# Patient Record
Sex: FEMALE | Employment: OTHER
[De-identification: names, ages, dates, MRNs, and addresses within clinical notes are randomized per-mention and may not be internally consistent; named-entity substitution may affect disease eponyms.]

---

## 2018-06-15 ENCOUNTER — CONSULTATION (OUTPATIENT)
Age: 68
End: 2018-06-15

## 2018-06-15 VITALS — DIASTOLIC BLOOD PRESSURE: 72 MMHG | SYSTOLIC BLOOD PRESSURE: 140 MMHG | HEIGHT: 55 IN

## 2018-06-15 NOTE — PATIENT DISCUSSION
Greater than 50% of exam spent in dialogue with patient. I have recommended a vitrectomy surgery to remove bothersome floaters from Mrs. Hankins's left eye. She understands the need for this surgery and would like to proceed. All questions and concerns were addressed at length with the patient. Schedule PPV OS.

## 2018-06-18 ASSESSMENT — VISUAL ACUITY
OS_PH: 20/40
OS_SC: 20/30
OD_SC: 20/25

## 2018-06-18 ASSESSMENT — TONOMETRY
OS_IOP_MMHG: 19
OD_IOP_MMHG: 16

## 2018-10-12 ENCOUNTER — FOLLOW UP (OUTPATIENT)
Age: 68
End: 2018-10-12

## 2018-10-12 NOTE — PATIENT DISCUSSION
Greater than 50% of exam spent in dialogue with patient. I have recommended a vitrectomy surgery in Mrs. Hankins's left eye. She understands the need for this surgery and would like to proceed. All questions and concerns were addressed at length with the patient. Schedule PPV OS.

## 2018-10-15 ASSESSMENT — TONOMETRY
OD_IOP_MMHG: 16
OS_IOP_MMHG: 18

## 2018-10-15 ASSESSMENT — VISUAL ACUITY
OD_SC: 20/20-1
OS_SC: 20/40-1

## 2019-02-08 ENCOUNTER — FOLLOW UP (OUTPATIENT)
Age: 69
End: 2019-02-08

## 2019-02-08 NOTE — PATIENT DISCUSSION
Discussed with the patient that the floaters are gone.  She might now and again see one but it will settle out of her sight.

## 2019-02-11 ASSESSMENT — VISUAL ACUITY
OS_SC: 20/25-1
OD_SC: 20/25-1

## 2019-02-11 ASSESSMENT — TONOMETRY
OS_IOP_MMHG: 22
OD_IOP_MMHG: 19

## 2020-07-25 ENCOUNTER — TELEPHONE ENCOUNTER (OUTPATIENT)
Dept: URBAN - METROPOLITAN AREA CLINIC 13 | Facility: CLINIC | Age: 70
End: 2020-07-25

## 2020-07-26 ENCOUNTER — TELEPHONE ENCOUNTER (OUTPATIENT)
Dept: URBAN - METROPOLITAN AREA CLINIC 13 | Facility: CLINIC | Age: 70
End: 2020-07-26

## 2024-08-06 ENCOUNTER — OFFICE VISIT (OUTPATIENT)
Dept: URBAN - METROPOLITAN AREA CLINIC 72 | Facility: CLINIC | Age: 74
End: 2024-08-06

## 2024-08-13 PROBLEM — 312824007: Status: ACTIVE | Noted: 2024-08-13

## 2024-08-13 PROBLEM — 102614006: Status: ACTIVE | Noted: 2024-08-13

## 2024-08-14 ENCOUNTER — DASHBOARD ENCOUNTERS (OUTPATIENT)
Age: 74
End: 2024-08-14

## 2024-08-14 ENCOUNTER — OFFICE VISIT (OUTPATIENT)
Dept: URBAN - METROPOLITAN AREA CLINIC 72 | Facility: CLINIC | Age: 74
End: 2024-08-14
Payer: MEDICARE

## 2024-08-14 VITALS
DIASTOLIC BLOOD PRESSURE: 63 MMHG | SYSTOLIC BLOOD PRESSURE: 122 MMHG | HEART RATE: 79 BPM | HEIGHT: 62 IN | WEIGHT: 171.8 LBS | BODY MASS INDEX: 31.62 KG/M2 | TEMPERATURE: 97.2 F

## 2024-08-14 DIAGNOSIS — R10.84 GENERALIZED ABDOMINAL PAIN: ICD-10-CM

## 2024-08-14 DIAGNOSIS — K59.01 SLOW TRANSIT CONSTIPATION: ICD-10-CM

## 2024-08-14 DIAGNOSIS — Z80.0 FAMILY HISTORY OF COLON CANCER: ICD-10-CM

## 2024-08-14 PROBLEM — 35298007: Status: ACTIVE | Noted: 2024-08-14

## 2024-08-14 PROCEDURE — 99204 OFFICE O/P NEW MOD 45 MIN: CPT

## 2024-08-14 RX ORDER — FAMOTIDINE 20 MG/1
TAKE 1 TABLET TABLET, FILM COATED ORAL ONCE A DAY
Status: ACTIVE | COMMUNITY

## 2024-08-14 RX ORDER — MULTIVIT-MIN/IRON/FOLIC ACID/K 18-600-40
AS DIRECTED CAPSULE ORAL
Status: ACTIVE | COMMUNITY

## 2024-08-14 RX ORDER — AMLODIPINE BESYLATE 2.5 MG/1
1 TABLET TABLET ORAL ONCE A DAY
Status: ACTIVE | COMMUNITY

## 2024-08-14 RX ORDER — LISINOPRIL AND HYDROCHLOROTHIAZIDE 10; 12.5 MG/1; MG/1
1 TABLET TABLET ORAL ONCE A DAY
Status: ACTIVE | COMMUNITY

## 2024-08-14 RX ORDER — MELATONIN 10 MG
1 TABLET TABLET, SUBLINGUAL SUBLINGUAL
Status: ACTIVE | COMMUNITY

## 2024-08-14 RX ORDER — LEVOTHYROXINE SODIUM 75 UG/1
1 TABLET IN THE MORNING ON AN EMPTY STOMACH TABLET ORAL ONCE A DAY
Status: ACTIVE | COMMUNITY

## 2024-08-14 RX ORDER — ROSUVASTATIN CALCIUM 20 MG/1
1 TABLET TABLET, COATED ORAL EVERY OTHER DAY
Status: ACTIVE | COMMUNITY

## 2024-08-14 RX ORDER — TIMOLOL MALEATE 5 MG/ML
1 DROP INTO AFFECTED EYE SOLUTION/ DROPS OPHTHALMIC ONCE A DAY
Status: ACTIVE | COMMUNITY

## 2024-08-14 RX ORDER — ROSUVASTATIN CALCIUM 10 MG/1
1 TABLET TABLET, COATED ORAL EVERY OTHER DAY
Status: ACTIVE | COMMUNITY

## 2024-08-14 RX ORDER — GABAPENTIN 600 MG/1
1 TABLET TABLET, FILM COATED ORAL ONCE A DAY
Status: ACTIVE | COMMUNITY

## 2024-08-14 NOTE — HPI-TODAY'S VISIT:
Patient is a 73-year-old female referred by Dr. Eulalia Bangura for abdominal pain. She has generalized abdominal pain and left sided abd pain starting in April. In May, she had diarrhea for 10 days, and then her generalized pain got worse. In March, patient went to [place]. She is stooling 4-5 times daily. There is a lot of mucus. Starts off harder and then gets softer. Has abd bloating. Occasional nausea/vomiting.   DI:  CT abdomen and pelvis without contrast -6/28/2024 -nonobstructing stone located in the left renal pelvis measuring 7 mm.  GI normal.  Procedures:  -Colonoscopy -8/14/2023 performed by Dr. Hermilo Ashraf -unremarkable colonoscopy except for small hemorrhoids.  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  Due 8/2025

## 2024-08-21 ENCOUNTER — WEB ENCOUNTER (OUTPATIENT)
Dept: URBAN - METROPOLITAN AREA CLINIC 72 | Facility: CLINIC | Age: 74
End: 2024-08-21

## 2024-08-22 ENCOUNTER — WEB ENCOUNTER (OUTPATIENT)
Dept: URBAN - METROPOLITAN AREA CLINIC 72 | Facility: CLINIC | Age: 74
End: 2024-08-22

## 2024-09-23 ENCOUNTER — OFFICE VISIT (OUTPATIENT)
Dept: URBAN - METROPOLITAN AREA CLINIC 72 | Facility: CLINIC | Age: 74
End: 2024-09-23
Payer: MEDICARE

## 2024-09-23 VITALS
BODY MASS INDEX: 31.73 KG/M2 | HEART RATE: 65 BPM | SYSTOLIC BLOOD PRESSURE: 127 MMHG | TEMPERATURE: 97.9 F | DIASTOLIC BLOOD PRESSURE: 64 MMHG | WEIGHT: 172.4 LBS | HEIGHT: 62 IN

## 2024-09-23 DIAGNOSIS — R19.5 MUCUS IN STOOL: ICD-10-CM

## 2024-09-23 DIAGNOSIS — R10.84 GENERALIZED ABDOMINAL PAIN: ICD-10-CM

## 2024-09-23 DIAGNOSIS — K59.01 SLOW TRANSIT CONSTIPATION: ICD-10-CM

## 2024-09-23 PROBLEM — 271864008: Status: ACTIVE | Noted: 2024-09-23

## 2024-09-23 PROCEDURE — 99214 OFFICE O/P EST MOD 30 MIN: CPT

## 2024-09-23 RX ORDER — ROSUVASTATIN CALCIUM 10 MG/1
1 TABLET TABLET, COATED ORAL EVERY OTHER DAY
Status: ACTIVE | COMMUNITY

## 2024-09-23 RX ORDER — AMLODIPINE BESYLATE 2.5 MG/1
1 TABLET TABLET ORAL ONCE A DAY
Status: ACTIVE | COMMUNITY

## 2024-09-23 RX ORDER — LEVOTHYROXINE SODIUM 75 UG/1
1 TABLET IN THE MORNING ON AN EMPTY STOMACH TABLET ORAL ONCE A DAY
Status: ACTIVE | COMMUNITY

## 2024-09-23 RX ORDER — GABAPENTIN 600 MG/1
1 TABLET TABLET, FILM COATED ORAL ONCE A DAY
Status: ACTIVE | COMMUNITY

## 2024-09-23 RX ORDER — FAMOTIDINE 20 MG/1
TAKE 1 TABLET TABLET, FILM COATED ORAL ONCE A DAY
Status: ACTIVE | COMMUNITY

## 2024-09-23 RX ORDER — LISINOPRIL AND HYDROCHLOROTHIAZIDE 10; 12.5 MG/1; MG/1
1 TABLET TABLET ORAL ONCE A DAY
Status: ACTIVE | COMMUNITY

## 2024-09-23 RX ORDER — MULTIVIT-MIN/IRON/FOLIC ACID/K 18-600-40
AS DIRECTED CAPSULE ORAL
Status: ACTIVE | COMMUNITY

## 2024-09-23 RX ORDER — TIMOLOL MALEATE 5 MG/ML
1 DROP INTO AFFECTED EYE SOLUTION/ DROPS OPHTHALMIC ONCE A DAY
Status: ACTIVE | COMMUNITY

## 2024-09-23 RX ORDER — MELATONIN 10 MG
1 TABLET TABLET, SUBLINGUAL SUBLINGUAL
Status: ACTIVE | COMMUNITY

## 2024-09-23 RX ORDER — ROSUVASTATIN CALCIUM 20 MG/1
1 TABLET TABLET, COATED ORAL EVERY OTHER DAY
Status: ACTIVE | COMMUNITY

## 2024-09-23 NOTE — HPI-OTHER HISTORIES
DI: CT abdomen and pelvis without contrast -6/28/2024 -nonobstructing stone located in the left renal pelvis measuring 7 mm. GI normal.  Procedures: -Colonoscopy -8/14/2023 performed by Dr. Hermilo Ashraf -unremarkable colonoscopy except for small hemorrhoids. 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 Due 8/2025

## 2024-09-23 NOTE — HPI-TODAY'S VISIT:
Patient is a 73-year-old female last seen in the office on 8/14/2024 for abdominal pain, constipation.  Patient was asked to start MiraLAX and fiber daily which she did, but started having too many bowel movements on the MiraLAX.  She reached out to me via email, and I advised her to cut back to half a capful a day of the MiraLAX.  That was about 4 weeks ago so the patient is here now for reassessment.  Patient is not tolerating miralax. Even with the half dose of miralax she states that seh was havign multiple BM's daily and would only take it when she was at home. She states that it is "affecting her quality of life" that she would stool after coffe, again an hour or so later, and then again after eating. I explained that this is a natural phenomenom, but we can try Rx medications.   No blood in the stool. Alot less mucus in the stool. Abd pain generalized and LLQ.

## 2024-10-02 ENCOUNTER — WEB ENCOUNTER (OUTPATIENT)
Dept: URBAN - METROPOLITAN AREA CLINIC 72 | Facility: CLINIC | Age: 74
End: 2024-10-02

## 2024-10-11 ENCOUNTER — OFFICE VISIT (OUTPATIENT)
Dept: URBAN - METROPOLITAN AREA CLINIC 72 | Facility: CLINIC | Age: 74
End: 2024-10-11

## 2024-10-22 ENCOUNTER — OFFICE VISIT (OUTPATIENT)
Dept: URBAN - METROPOLITAN AREA CLINIC 72 | Facility: CLINIC | Age: 74
End: 2024-10-22
Payer: MEDICARE

## 2024-10-22 VITALS
HEART RATE: 66 BPM | DIASTOLIC BLOOD PRESSURE: 67 MMHG | SYSTOLIC BLOOD PRESSURE: 128 MMHG | WEIGHT: 171.2 LBS | TEMPERATURE: 97.7 F | HEIGHT: 62 IN | BODY MASS INDEX: 31.5 KG/M2

## 2024-10-22 DIAGNOSIS — R10.84 GENERALIZED ABDOMINAL PAIN: ICD-10-CM

## 2024-10-22 DIAGNOSIS — K59.01 SLOW TRANSIT CONSTIPATION: ICD-10-CM

## 2024-10-22 DIAGNOSIS — Z80.0 FAMILY HISTORY OF COLON CANCER: ICD-10-CM

## 2024-10-22 PROCEDURE — 99214 OFFICE O/P EST MOD 30 MIN: CPT | Performed by: INTERNAL MEDICINE

## 2024-10-22 RX ORDER — ROSUVASTATIN CALCIUM 10 MG/1
1 TABLET TABLET, COATED ORAL EVERY OTHER DAY
Status: ACTIVE | COMMUNITY

## 2024-10-22 RX ORDER — MULTIVIT-MIN/IRON/FOLIC ACID/K 18-600-40
AS DIRECTED CAPSULE ORAL
Status: ACTIVE | COMMUNITY

## 2024-10-22 RX ORDER — FAMOTIDINE 20 MG/1
TAKE 1 TABLET TABLET, FILM COATED ORAL ONCE A DAY
Status: ACTIVE | COMMUNITY

## 2024-10-22 RX ORDER — WHEAT DEXTRIN 3 G/3.5 G
AS DIRECTED POWDER (GRAM) ORAL
Status: ACTIVE | COMMUNITY

## 2024-10-22 RX ORDER — TIMOLOL MALEATE 5 MG/ML
1 DROP INTO AFFECTED EYE SOLUTION/ DROPS OPHTHALMIC ONCE A DAY
Status: ACTIVE | COMMUNITY

## 2024-10-22 RX ORDER — GABAPENTIN 600 MG/1
1 TABLET TABLET, FILM COATED ORAL ONCE A DAY
Status: ACTIVE | COMMUNITY

## 2024-10-22 RX ORDER — ROSUVASTATIN CALCIUM 20 MG/1
1 TABLET TABLET, COATED ORAL EVERY OTHER DAY
Status: ACTIVE | COMMUNITY

## 2024-10-22 RX ORDER — LISINOPRIL AND HYDROCHLOROTHIAZIDE 10; 12.5 MG/1; MG/1
1 TABLET TABLET ORAL ONCE A DAY
Status: ACTIVE | COMMUNITY

## 2024-10-22 RX ORDER — AMLODIPINE BESYLATE 2.5 MG/1
1 TABLET TABLET ORAL ONCE A DAY
Status: ACTIVE | COMMUNITY

## 2024-10-22 RX ORDER — LEVOTHYROXINE SODIUM 75 UG/1
1 TABLET IN THE MORNING ON AN EMPTY STOMACH TABLET ORAL ONCE A DAY
Status: ACTIVE | COMMUNITY

## 2024-10-22 RX ORDER — POLYETHYLENE GLYCOL 3350 17 G/17G
1 SCOOP MIXED WITH 8 OUNCES OF FLUID POWDER, FOR SOLUTION ORAL ONCE A DAY
Status: ACTIVE | COMMUNITY

## 2024-10-22 RX ORDER — MELATONIN 10 MG
1 TABLET TABLET, SUBLINGUAL SUBLINGUAL
Status: ACTIVE | COMMUNITY

## 2024-10-22 NOTE — EXAM-PHYSICAL EXAM
General--no acute distress, resting comfortably Eyes--anicteric, no pallor HENT--normocephalic, atraumatic head Neck--no lymphadenopathy, symmetric Chest--non labored breathing, equal rise Abdomen--soft, non tender, non distended, no organomegaly Ext: GERMAIN, no obvious sores or rashes

## 2024-10-22 NOTE — HPI-TODAY'S VISIT:
Mrs. Lazo returns for follow-up.  Recall she is a 74-year-old last seen in office on 9/23/2024.  She has a history of abdominal pain with constipation.  She had been trialed on MiraLAX but could not tolerate it was given samples of Linzess 72 mcg and stool softener to try.  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  She reports that she has numerous bowel movements in the morning cannot leave the house before 12 or 1 PM.  Bowel movements typically occur in the morning or postprandially.  Benefiber does not seem to be helping.  She has not tried a psyllium husk based product.  She is not having any bloating.  She admits that she is not finding any specific diet.

## 2024-12-03 ENCOUNTER — OFFICE VISIT (OUTPATIENT)
Dept: URBAN - METROPOLITAN AREA CLINIC 72 | Facility: CLINIC | Age: 74
End: 2024-12-03
Payer: MEDICARE

## 2024-12-03 VITALS
WEIGHT: 174.8 LBS | TEMPERATURE: 98.1 F | HEART RATE: 67 BPM | BODY MASS INDEX: 32.17 KG/M2 | SYSTOLIC BLOOD PRESSURE: 134 MMHG | DIASTOLIC BLOOD PRESSURE: 78 MMHG | HEIGHT: 62 IN

## 2024-12-03 DIAGNOSIS — Z80.0 FAMILY HISTORY OF COLON CANCER: ICD-10-CM

## 2024-12-03 DIAGNOSIS — K59.01 SLOW TRANSIT CONSTIPATION: ICD-10-CM

## 2024-12-03 PROCEDURE — 99213 OFFICE O/P EST LOW 20 MIN: CPT | Performed by: INTERNAL MEDICINE

## 2024-12-03 RX ORDER — TIMOLOL MALEATE 5 MG/ML
1 DROP INTO AFFECTED EYE SOLUTION/ DROPS OPHTHALMIC ONCE A DAY
Status: ACTIVE | COMMUNITY

## 2024-12-03 RX ORDER — POLYETHYLENE GLYCOL 3350 17 G/DOSE
1 SCOOP MIXED WITH 8 OUNCES OF FLUID POWDER (GRAM) ORAL ONCE A DAY
Status: ACTIVE | COMMUNITY

## 2024-12-03 RX ORDER — WHEAT DEXTRIN 3 G/3.5 G
AS DIRECTED POWDER (GRAM) ORAL
Status: ACTIVE | COMMUNITY

## 2024-12-03 RX ORDER — FAMOTIDINE 20 MG/1
TAKE 1 TABLET TABLET, FILM COATED ORAL ONCE A DAY
Status: ACTIVE | COMMUNITY

## 2024-12-03 RX ORDER — ROSUVASTATIN CALCIUM 10 MG/1
1 TABLET TABLET, COATED ORAL EVERY OTHER DAY
Status: ACTIVE | COMMUNITY

## 2024-12-03 RX ORDER — AMLODIPINE BESYLATE 2.5 MG/1
1 TABLET TABLET ORAL ONCE A DAY
Status: ACTIVE | COMMUNITY

## 2024-12-03 RX ORDER — ROSUVASTATIN CALCIUM 20 MG/1
1 TABLET TABLET, COATED ORAL EVERY OTHER DAY
Status: ACTIVE | COMMUNITY

## 2024-12-03 RX ORDER — LEVOTHYROXINE SODIUM 75 UG/1
1 TABLET IN THE MORNING ON AN EMPTY STOMACH TABLET ORAL ONCE A DAY
Status: ACTIVE | COMMUNITY

## 2024-12-03 RX ORDER — MELATONIN 10 MG
1 TABLET TABLET, SUBLINGUAL SUBLINGUAL
Status: ACTIVE | COMMUNITY

## 2024-12-03 RX ORDER — LISINOPRIL AND HYDROCHLOROTHIAZIDE 10; 12.5 MG/1; MG/1
1 TABLET TABLET ORAL ONCE A DAY
Status: ACTIVE | COMMUNITY

## 2024-12-03 RX ORDER — MULTIVIT-MIN/IRON/FOLIC ACID/K 18-600-40
AS DIRECTED CAPSULE ORAL
Status: ACTIVE | COMMUNITY

## 2024-12-03 RX ORDER — GABAPENTIN 600 MG/1
1 TABLET TABLET, FILM COATED ORAL ONCE A DAY
Status: ACTIVE | COMMUNITY

## 2024-12-03 NOTE — HPI-TODAY'S VISIT:
Mrs. Lazo returns for follow-up.  Recall she is a 74-year-old last seen in office on 10/22/2024.  She has history of abdominal pain and constipation.  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  She has been using MiraLAX and Linzess 72 mcg.  The Linzess caused diarrhea so she went back to MiraLAX.  I tried Benefiber not a Metamucil or psyllium husk based product.  She reported she was having numerous bowel movements in the morning and cannot leave the house before 1 PM.  We advised aggressive lifestyle modifications and Metamucil.  She reports that she is doing tremendously better with MiraLAX and Metamucil.  The combination has made her have a bowel movement every day without copious diarrhea, her stools both well and she has no complaints today.

## 2025-03-12 ENCOUNTER — WEB ENCOUNTER (OUTPATIENT)
Dept: URBAN - METROPOLITAN AREA CLINIC 72 | Facility: CLINIC | Age: 75
End: 2025-03-12

## 2025-03-13 ENCOUNTER — WEB ENCOUNTER (OUTPATIENT)
Dept: URBAN - METROPOLITAN AREA CLINIC 72 | Facility: CLINIC | Age: 75
End: 2025-03-13

## 2025-03-14 ENCOUNTER — LAB OUTSIDE AN ENCOUNTER (OUTPATIENT)
Dept: URBAN - METROPOLITAN AREA CLINIC 72 | Facility: CLINIC | Age: 75
End: 2025-03-14

## 2025-03-14 PROBLEM — 312824007: Status: ACTIVE | Noted: 2025-03-14

## 2025-04-29 ENCOUNTER — OFFICE VISIT (OUTPATIENT)
Dept: URBAN - METROPOLITAN AREA CLINIC 72 | Facility: CLINIC | Age: 75
End: 2025-04-29
Payer: MEDICARE

## 2025-04-29 DIAGNOSIS — Z80.0 FH: COLON CANCER: ICD-10-CM

## 2025-04-29 DIAGNOSIS — R10.84 GENERALIZED ABDOMINAL PAIN: ICD-10-CM

## 2025-04-29 DIAGNOSIS — R19.5 MUCUS IN STOOL: ICD-10-CM

## 2025-04-29 DIAGNOSIS — K59.01 SLOW TRANSIT CONSTIPATION: ICD-10-CM

## 2025-04-29 PROCEDURE — 99214 OFFICE O/P EST MOD 30 MIN: CPT

## 2025-04-29 RX ORDER — GABAPENTIN 600 MG/1
1 TABLET TABLET, FILM COATED ORAL ONCE A DAY
Status: ACTIVE | COMMUNITY

## 2025-04-29 RX ORDER — PSYLLIUM HUSK 0.4 G
AS DIRECTED CAPSULE ORAL
Status: ACTIVE | COMMUNITY

## 2025-04-29 RX ORDER — ROSUVASTATIN CALCIUM 10 MG/1
1 TABLET TABLET, COATED ORAL EVERY OTHER DAY
Status: ACTIVE | COMMUNITY

## 2025-04-29 RX ORDER — LISINOPRIL AND HYDROCHLOROTHIAZIDE 10; 12.5 MG/1; MG/1
1 TABLET TABLET ORAL ONCE A DAY
Status: ACTIVE | COMMUNITY

## 2025-04-29 RX ORDER — LEVOTHYROXINE SODIUM 75 UG/1
1 TABLET IN THE MORNING ON AN EMPTY STOMACH TABLET ORAL ONCE A DAY
Status: ACTIVE | COMMUNITY

## 2025-04-29 RX ORDER — TIMOLOL MALEATE 5 MG/ML
1 DROP INTO AFFECTED EYE SOLUTION/ DROPS OPHTHALMIC ONCE A DAY
Status: ACTIVE | COMMUNITY

## 2025-04-29 RX ORDER — POLYETHYLENE GLYCOL 3350 17 G/DOSE
1 SCOOP MIXED WITH 8 OUNCES OF FLUID POWDER (GRAM) ORAL ONCE A DAY
Status: ACTIVE | COMMUNITY

## 2025-04-29 RX ORDER — FAMOTIDINE 20 MG/1
TAKE 1 TABLET TABLET, FILM COATED ORAL ONCE A DAY
Status: ACTIVE | COMMUNITY

## 2025-04-29 RX ORDER — AMLODIPINE BESYLATE 2.5 MG/1
1 TABLET TABLET ORAL ONCE A DAY
Status: ACTIVE | COMMUNITY

## 2025-04-29 RX ORDER — WHEAT DEXTRIN 3 G/3.5 G
AS DIRECTED POWDER (GRAM) ORAL
Status: DISCONTINUED | COMMUNITY

## 2025-04-29 RX ORDER — MULTIVIT-MIN/IRON/FOLIC ACID/K 18-600-40
AS DIRECTED CAPSULE ORAL
Status: ACTIVE | COMMUNITY

## 2025-04-29 RX ORDER — MELATONIN 10 MG
1 TABLET TABLET, SUBLINGUAL SUBLINGUAL
Status: ACTIVE | COMMUNITY

## 2025-04-29 RX ORDER — ROSUVASTATIN CALCIUM 20 MG/1
1 TABLET TABLET, COATED ORAL EVERY OTHER DAY
Status: ACTIVE | COMMUNITY

## 2025-04-29 NOTE — HPI-TODAY'S VISIT:
Patient is a 74-year-old female with a history of slow transit constipation with a family history of colon cancer due for colonoscopy in 2025.  Patient was asked to start fiber and MiraLAX daily with a stimulant every third day for what was thought to be constipation with overflow diarrhea.  Patient reached out at the beginning of March requesting stool studies and a colonoscopy.  She was scheduled for a colonoscopy to take place on 5/29/2025, but I did not feel that stool studies were necessary.  She is here for follow-up.  Patient is seen today and is taking 1 heaping tspn of metamucil and 1/2 capful of miralax every evening. She took dulcolax every 3 days for a couple of weeks, but it gave her incredible diarrhea. On Easter, she went out to eat and when she came home she had diarrhea x12 and vomited and she has not had any diarrhea since. She is now stooling 1-2 times daily - it is formed , soft stool.

## 2025-05-17 ENCOUNTER — WEB ENCOUNTER (OUTPATIENT)
Dept: URBAN - METROPOLITAN AREA CLINIC 72 | Facility: CLINIC | Age: 75
End: 2025-05-17

## 2025-05-29 ENCOUNTER — OFFICE VISIT (OUTPATIENT)
Dept: URBAN - METROPOLITAN AREA MEDICAL CENTER 40 | Facility: MEDICAL CENTER | Age: 75
End: 2025-05-29

## 2025-06-17 ENCOUNTER — OFFICE VISIT (OUTPATIENT)
Dept: URBAN - METROPOLITAN AREA CLINIC 72 | Facility: CLINIC | Age: 75
End: 2025-06-17
Payer: MEDICARE

## 2025-06-17 DIAGNOSIS — R19.5 MUCUS IN STOOL: ICD-10-CM

## 2025-06-17 DIAGNOSIS — R10.84 GENERALIZED ABDOMINAL PAIN: ICD-10-CM

## 2025-06-17 DIAGNOSIS — K59.01 SLOW TRANSIT CONSTIPATION: ICD-10-CM

## 2025-06-17 DIAGNOSIS — Z80.0 FH: COLON CANCER: ICD-10-CM

## 2025-06-17 PROCEDURE — 99213 OFFICE O/P EST LOW 20 MIN: CPT

## 2025-06-17 RX ORDER — ROSUVASTATIN CALCIUM 10 MG/1
1 TABLET TABLET, COATED ORAL EVERY OTHER DAY
Status: ACTIVE | COMMUNITY

## 2025-06-17 RX ORDER — PSYLLIUM HUSK 0.4 G
AS DIRECTED CAPSULE ORAL
Status: ON HOLD | COMMUNITY

## 2025-06-17 RX ORDER — AMLODIPINE BESYLATE 2.5 MG/1
1 TABLET TABLET ORAL ONCE A DAY
Status: ACTIVE | COMMUNITY

## 2025-06-17 RX ORDER — POLYETHYLENE GLYCOL 3350 17 G/DOSE
1 SCOOP MIXED WITH 8 OUNCES OF FLUID POWDER (GRAM) ORAL ONCE A DAY
Status: ON HOLD | COMMUNITY

## 2025-06-17 RX ORDER — TIMOLOL MALEATE 5 MG/ML
1 DROP INTO AFFECTED EYE SOLUTION/ DROPS OPHTHALMIC ONCE A DAY
Status: ACTIVE | COMMUNITY

## 2025-06-17 RX ORDER — LISINOPRIL AND HYDROCHLOROTHIAZIDE 10; 12.5 MG/1; MG/1
1 TABLET TABLET ORAL ONCE A DAY
Status: ACTIVE | COMMUNITY

## 2025-06-17 RX ORDER — ROSUVASTATIN CALCIUM 20 MG/1
1 TABLET TABLET, COATED ORAL EVERY OTHER DAY
Status: ACTIVE | COMMUNITY

## 2025-06-17 RX ORDER — MELATONIN 10 MG
1 TABLET TABLET, SUBLINGUAL SUBLINGUAL
Status: ACTIVE | COMMUNITY

## 2025-06-17 RX ORDER — GABAPENTIN 600 MG/1
1 TABLET TABLET ORAL ONCE A DAY
Status: ACTIVE | COMMUNITY

## 2025-06-17 RX ORDER — LEVOTHYROXINE SODIUM 75 UG/1
1 TABLET IN THE MORNING ON AN EMPTY STOMACH TABLET ORAL ONCE A DAY
Status: ACTIVE | COMMUNITY

## 2025-06-17 RX ORDER — MULTIVIT-MIN/IRON/FOLIC ACID/K 18-600-40
AS DIRECTED CAPSULE ORAL
Status: ACTIVE | COMMUNITY

## 2025-06-17 RX ORDER — FAMOTIDINE 20 MG/1
TAKE 1 TABLET TABLET, FILM COATED ORAL ONCE A DAY
Status: ACTIVE | COMMUNITY

## 2025-06-17 NOTE — HPI-TODAY'S VISIT:
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  Patient is here for colonoscopy follow-up.  Patient is seen today to review colonoscopy. She did great with the procedure and has no complaints today.

## 2025-06-17 NOTE — HPI-OTHER HISTORIES
Procedures: 5/29/2025-colonoscopy: 9 mm descending colon polyp.  Path: Tubular adenoma.  Negative for high-grade dysplasia.  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  Due 5/2027.